# Patient Record
Sex: FEMALE | Race: OTHER | Employment: FULL TIME | URBAN - METROPOLITAN AREA
[De-identification: names, ages, dates, MRNs, and addresses within clinical notes are randomized per-mention and may not be internally consistent; named-entity substitution may affect disease eponyms.]

---

## 2019-02-08 ENCOUNTER — HOSPITAL ENCOUNTER (EMERGENCY)
Age: 54
Discharge: HOME | End: 2019-02-08
Attending: EMERGENCY MEDICINE | Admitting: EMERGENCY MEDICINE
Payer: COMMERCIAL

## 2019-02-08 ENCOUNTER — APPOINTMENT (OUTPATIENT)
Dept: GENERAL RADIOLOGY | Age: 54
End: 2019-02-08
Attending: EMERGENCY MEDICINE
Payer: COMMERCIAL

## 2019-02-08 VITALS
DIASTOLIC BLOOD PRESSURE: 88 MMHG | OXYGEN SATURATION: 97 % | HEART RATE: 83 BPM | HEIGHT: 59 IN | RESPIRATION RATE: 18 BRPM | SYSTOLIC BLOOD PRESSURE: 149 MMHG | TEMPERATURE: 97.2 F | BODY MASS INDEX: 31.25 KG/M2 | WEIGHT: 155 LBS

## 2019-02-08 DIAGNOSIS — S39.012A STRAIN OF LUMBAR REGION, INITIAL ENCOUNTER: ICD-10-CM

## 2019-02-08 DIAGNOSIS — S20.219A CONTUSION OF STERNUM, INITIAL ENCOUNTER: ICD-10-CM

## 2019-02-08 DIAGNOSIS — R03.0 ELEVATED BLOOD PRESSURE READING: ICD-10-CM

## 2019-02-08 DIAGNOSIS — S16.1XXA STRAIN OF NECK MUSCLE, INITIAL ENCOUNTER: Primary | ICD-10-CM

## 2019-02-08 PROCEDURE — 99283 EMERGENCY DEPT VISIT LOW MDM: CPT

## 2019-02-08 PROCEDURE — 72050 X-RAY EXAM NECK SPINE 4/5VWS: CPT

## 2019-02-08 PROCEDURE — 72110 X-RAY EXAM L-2 SPINE 4/>VWS: CPT

## 2019-02-08 PROCEDURE — 71046 X-RAY EXAM CHEST 2 VIEWS: CPT

## 2019-02-08 RX ORDER — CYCLOBENZAPRINE HCL 10 MG
10 TABLET ORAL
Qty: 15 TAB | Refills: 0 | Status: SHIPPED | OUTPATIENT
Start: 2019-02-08

## 2019-02-08 RX ORDER — IBUPROFEN 600 MG/1
600 TABLET ORAL
Qty: 20 TAB | Refills: 0 | Status: SHIPPED | OUTPATIENT
Start: 2019-02-08

## 2019-02-08 NOTE — DISCHARGE INSTRUCTIONS
Patient Education     1) Rest with light stretching as tolerated. 2) Ice packs to low back, 20 minutes at a time, 2 to 3 times daily. 3) Follow up with your PCP for recheck in 1 week if not improved. 4) Consider chiropractic treatment for symptoms lasting more than 7 to 10 days. 5) Ibuprofen for inflammation  6) Flexeril for spasm  7) X-rays ok  8) Blood pressure elevated and requires follow up       Distensión en la espalda en niños: Instrucciones de cuidado - [ Back Strain in Children: Care Instructions ]  Instrucciones de cuidado    La distensión en la espalda ocurre cuando loco hijo estira demasiado o fuerza un músculo de la espalda. Loco hijo se puede lastimar la espalda en un accidente o al hacer ejercicio o al levantar algo. La mayoría de los snow de espalda mejoran con reposo y [place]. Usted puede cuidar a loco hijo en el [facility] para ayudar a que le sane la espalda. La atención de seguimiento es erika parte clave del tratamiento y la seguridad de loco hijo. Asegúrese de hacer y acudir a todas las citas, y llame a loco médico si loco hijo está teniendo problemas. También es erika buena idea saber los resultados de los exámenes de loco hijo y mantener erika lista de los medicamentos que adrienne. ¿Cómo puede cuidar a loco hijo en el [facility]? · Trate de mantener a loco hijo tan activo ulises pueda, treasure interrumpa o reduzca cualquier actividad que le cause dolor. · Coloque hielo o erika compresa fría sobre el músculo adolorido de loco hijo de 10 a 20 minutos cada vez para detener la hinchazón. Trate de hacerlo cada 1 o 2 horas bryant 3 días (cuando loco hijo esté despierto) o hasta que la hinchazón baje. Póngale a loco hijo un paño bella entre la compresa de hielo y la piel. · Después de 2 o 3 días, aplíquele a loco hijo un paño tibio en la espalda. Algunos médicos sugieren que se alterne entre tratamientos calientes y fríos. · Sea cristy con los medicamentos.  Johan los analgésicos (medicamentos para el dolor) exactamente según las indicaciones. ? Si el médico le recetó un analgésico a loco hijo, déselo según las indicaciones. ? Si loco hijo no está tomando un analgésico recetado, pregúntele a loco médico si puede darle iveth de [employer]. · Lorena que loco hijo trate de dormir sobre loco costado con erika almohada entre las piernas. O póngale a loco hijo erika almohada debajo de las rodillas cuando se acueste [place] [facility]). Estas medidas pueden aliviar el dolor en la parte baja de la espalda. · Lorena que loco hijo vuelva a loco nivel habitual de [place] poco a poco.  ¿Cuándo debe pedir ayuda? Llame al 911 en cualquier momento que considere que loco hijo necesita atención de [place]. Por ejemplo, llame si:    · Loco hijo es absolutamente incapaz de  la pierna.    Llame a loco médico ahora mismo o busque atención médica inmediata si:    · Loco hijo tiene síntomas nuevos o peores en las piernas, el abdomen o las nalgas. Los síntomas pueden incluir:  ? Entumecimiento u hormigueo. ? Debilidad. ? Dolor.     · Loco hijo pierde el control de la vejiga o los intestinos.    Preste especial atención a los cambios en la rufus de loco hijo y asegúrese de comunicarse con loco médico si:    · Loco hijo tiene fiebre, pierde peso o no se siente debi.     · Loco hijo no mejora ulises se esperaba. ¿Dónde puede encontrar más información en inglés? Angelo Parks a http://will-jose.info/. Juan J Spotted I690 en la búsqueda para aprender más acerca de \"Distensión en la espalda en niños: Instrucciones de cuidado - [ Back Strain in Children: Care Instructions ]. \"  Revisado: 20 septiembre, 2018  Versión del contenido: 11.9  © 0532-6353 [employer]. Las instrucciones de cuidado fueron adaptadas bajo licencia por [employer] (which disclaims liability or warranty for this information). Si usted tiene [place] afección médica o sobre estas instrucciones, siempre pregunte a loco profesional de rufus.  [employer] niega toda garantía o responsabilidad por loco uso de esta información. Patient Education        Presión arterial elevada: Instrucciones de cuidado - [ Elevated Blood Pressure: Care Instructions ]  Instrucciones de cuidado    La presión arterial es erika medida de la fuerza que ejerce la maryam contra las lagunas de las arterias. Es normal que la presión arterial suba y baje a lo sarah del día. Moose si se mantiene lili por un tiempo, usted tiene presión arterial lili. Dos números indican loco presión arterial. El primer número es la presión sistólica. Muestra qué tan mera presiona la maryam cuando el corazón está bombeando. El luann número es la presión diastólica. Muestra qué tan mera presiona la [employer] latidos, cuando el corazón está relajado y llenándose de [place]. Gladystine Barnes arterial ideal para adultos es menos de 120/80 (diga \"120 sobre 80\"). La presión arterial lili es de 140/90 o superior. Usted tiene la presión arterial lili si el número de [place] es 140 o superior o el número de [facility] es 90 o superior, o ambas cosas. La prueba principal para la presión arterial lili es simple, rápida e indolora. Para diagnosticar presión arterial lili, loco médico examinará loco presión arterial en momentos diferentes. Después de tomarle la presión, es posible que loco médico le pida que se vuelva a jackson la presión en casa. Si se le diagnostica presión arterial lili, puede colaborar con loco médico para elaborar un plan a sarah plazo para manejarla. La atención de seguimiento es erika parte clave de loco tratamiento y seguridad. Asegúrese de hacer y acudir a todas las citas, y llame a loco médico si está teniendo problemas. También es erika buena idea saber los resultados de los exámenes y mantener erika lista de los medicamentos que adrienne. ¿Cómo puede cuidarse en el hogar? · No fume. Fumar aumenta loco riesgo de ataque cerebral y ataque al corazón.  Si necesita ayuda para dejarlo, hable con loco médico sobre programas y medicamentos para dejar de fumar. Estos pueden aumentar remington probabilidades de dejar de fumar para siempre. · Mantenga un peso saludable. · Trate de limitar la cantidad de sodio que ingiere a menos de 2,300 miligramos (mg) al día. Loco médico podría pedirle que trate de ingerir menos de 1,500 mg al día. · Manténgase físicamente activo. Lorena al menos 30 minutos de ejercicio la mayoría de los días de la [place]. Caminar es erika buena opción. Es posible que también quiera hacer otras actividades, ulises correr, nadar, [employer], o practicar tenis o deportes de equipo. · No tome alcohol o limite la cantidad que jeny. Hable con loco médico acerca de si puede jackson alcohol. · Coma abundantes frutas, verduras y productos lácteos bajos en grasa. Consuma menos grasa saturada y total.  · Aprenda a revisarse la presión arterial en loco hogar. ¿Cuándo debe pedir ayuda? Llame a loco médico ahora mismo o busque atención médica inmediata si:  ? · Loco presión arterial es mucho más lili de lo normal (ulises 180/110 o superior). ? · Marisol que la presión arterial lili está causando síntomas ulises:  ¨ Dolor de mee intenso. [address]. ? Vigile muy de cerca los cambios en loco rufus, y asegúrese de comunicarse con loco médico si:  ? · No mejora ulises se esperaba. ¿Dónde puede encontrar más información en inglés? Mariza Pratt a http://glenroy.info/. Sarina Servant Q823 en la búsqueda para aprender más acerca de \"Presión arterial elevada: Instrucciones de cuidado - [ Elevated Blood Pressure: Care Instructions ]. \"  Revisado: 21 septiembre, 2016  Versión del contenido: 11.4  © 6250-8940 [employer]. Las instrucciones de cuidado fueron adaptadas bajo licencia por [employer] (which disclaims liability or warranty for this information). Si usted tiene [place] afección médica o sobre estas instrucciones, siempre pregunte a loco profesional de rufus.  [employer] Inc[employer] niega toda garantía o responsabilidad por loco uso de [employer].

## 2019-02-08 NOTE — ED NOTES
Current Discharge Medication List  
  
START taking these medications Details  
cyclobenzaprine (FLEXERIL) 10 mg tablet Take 1 Tab by mouth three (3) times daily as needed for Muscle Spasm(s). Qty: 15 Tab, Refills: 0  
  
ibuprofen (MOTRIN) 600 mg tablet Take 1 Tab by mouth every six (6) hours as needed for Pain. Qty: 20 Tab, Refills: 0 Patient armband removed and shredded. Prescriptions given and reviewed with patient and family.

## 2019-02-08 NOTE — ED NOTES
Bedside and Verbal shift change report given to [address] [place] [address] (oncoming nurse) by Jae Fierro RN (offgoing nurse). Report included the following information SBAR, Kardex, ED Summary, Procedure Summary, Intake/Output, MAR, Accordion and Recent Results.
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
## 2019-02-08 NOTE — LETTER
NOTIFICATION RETURN TO WORK / SCHOOL 
 
2/8/2019 8:55 AM 
 
Ms. [address] [place] [address] To Whom It May Concern: 
 
Flo Greene is currently under the care of [id number] [place] [address] EMERGENCY DEPT. She will return to work/school on: 2/9/19 If there are questions or concerns please have the patient contact our office.  
 
 
 
Sincerely, 
 
 
 
Sarah Mooney MD
